# Patient Record
Sex: FEMALE | Race: WHITE | NOT HISPANIC OR LATINO | Employment: FULL TIME | URBAN - METROPOLITAN AREA
[De-identification: names, ages, dates, MRNs, and addresses within clinical notes are randomized per-mention and may not be internally consistent; named-entity substitution may affect disease eponyms.]

---

## 2023-05-10 ENCOUNTER — HOSPITAL ENCOUNTER (OUTPATIENT)
Dept: DATA CONVERSION | Facility: HOSPITAL | Age: 58
End: 2023-05-10
Attending: OTOLARYNGOLOGY | Admitting: OTOLARYNGOLOGY
Payer: COMMERCIAL

## 2023-05-10 DIAGNOSIS — J38.6 STENOSIS OF LARYNX: ICD-10-CM

## 2023-05-10 DIAGNOSIS — I10 ESSENTIAL (PRIMARY) HYPERTENSION: ICD-10-CM

## 2023-05-10 DIAGNOSIS — Z88.0 ALLERGY STATUS TO PENICILLIN: ICD-10-CM

## 2023-09-07 VITALS
BODY MASS INDEX: 0.28 KG/M2 | HEIGHT: 66 IN | HEART RATE: 73 BPM | RESPIRATION RATE: 16 BRPM | WEIGHT: 1.76 LBS | TEMPERATURE: 98.2 F | DIASTOLIC BLOOD PRESSURE: 73 MMHG | SYSTOLIC BLOOD PRESSURE: 134 MMHG

## 2023-09-14 NOTE — H&P
History of Present Illness:   Pregnant/Lactating:  ·  Are You Pregnant no (1)   ·  Are You Currently Breastfeeding no (1)     History Present Illness:  Reason for surgery: mdl   HPI:    HPI: No significant changes to the medical history since last clinic visit with ENT.    PMH: reviewed in chart   Fam Hx: Reviewed in EMR  Social Hx: Reviewed in EMR  Allergies: Reviewed in EMR  ROS: negative except as above in HPI  Medications, imaging and pertinent labs reviewed in EMR    A/P  Proceed with planned surgery     Allergies:        Allergies:  ·  penicillin : Facial Swelling    Home Medication Review:   Home Medications Reviewed: yes     Impression/Procedure:   ·  Impression and Planned Procedure: MDL       ERAS (Enhanced Recovery After Surgery):  ·  ERAS Patient: no       Vital Signs:  Temperature C: 36.8 degrees C   Temperature F: 98.2 degrees F   Heart Rate: 73 beats per minute   Respiratory Rate: 16 breath per minute   Blood Pressure Systolic: 134 mm/Hg   Blood Pressure Diastolic: 73 mm/Hg     Physical Exam by System:    Respiratory/Thorax: Unlabored breathing   Cardiovascular: No clubbing/cyanosis/edema of hands     Consent:   COVID-19 Consent:  ·  COVID-19 Risk Consent Surgeon has reviewed key risks related to the risk of carlos COVID-19 and if they contract COVID-19 what the risks are.     Attestation:   Note Completion:  I am a:  Resident/Fellow   Attending Attestation I saw and evaluated the patient.  I personally obtained the key and critical portions of the history and physical exam or was physically present for key and  critical portions performed by the resident/fellow. I reviewed the resident/fellow?s documentation and discussed the patient with the resident/fellow.  I agree with the resident/fellow?s medical decision making as documented in the note.     I personally evaluated the patient on 10-May-2023   Comments/ Additional Findings    Patient and I discussed the risks, benefits and alternatives  "to surgery and all questions answered.  Cleared to OR.          Electronic Signatures:  Steve Peck)  (Signed 10-May-2023 16:33)   Authored: Physical Exam, Note Completion   Co-Signer: History of Present Illness, Allergies, Home Medication Review, Impression/Procedure, ERAS, Physical Exam, Consent, Note Completion  Keven Sorto (Resident))  (Signed 10-May-2023 13:21)   Authored: History of Present Illness, Allergies, Home  Medication Review, Impression/Procedure, ERAS, Physical Exam, Consent, Note Completion      Last Updated: 10-May-2023 16:33 by Steve Peck)    References:  1.  Data Referenced From \"Patient Profile - Preop v3\" 10-May-2023 12:31   "

## 2023-09-25 PROBLEM — H61.22 IMPACTED CERUMEN OF LEFT EAR: Status: ACTIVE | Noted: 2023-09-25

## 2023-09-25 PROBLEM — J38.6 SUBGLOTTIC STENOSIS: Status: ACTIVE | Noted: 2023-09-25

## 2023-09-25 PROBLEM — R49.9 CHANGE IN VOICE: Status: ACTIVE | Noted: 2023-09-25

## 2023-09-25 PROBLEM — J98.8 AIRWAY OBSTRUCTION: Status: ACTIVE | Noted: 2023-09-25

## 2023-09-25 PROBLEM — R49.0 HOARSENESS OF VOICE: Status: ACTIVE | Noted: 2023-09-25

## 2023-09-25 PROBLEM — J38.7 LARYNGEAL MASS: Status: ACTIVE | Noted: 2023-09-25

## 2023-09-25 RX ORDER — FAMOTIDINE 20 MG/1
TABLET, FILM COATED ORAL
COMMUNITY
Start: 2023-04-19

## 2023-09-25 RX ORDER — FEXOFENADINE HCL 30 MG/5 ML
SUSPENSION, ORAL (FINAL DOSE FORM) ORAL
COMMUNITY
Start: 2021-11-11

## 2023-09-25 RX ORDER — METHYLPREDNISOLONE 4 MG/1
TABLET ORAL
COMMUNITY
Start: 2023-04-14

## 2023-09-25 RX ORDER — ALUMINUM HYDROXIDE, MAGNESIUM HYDROXIDE, AND SIMETHICONE 1200; 120; 1200 MG/30ML; MG/30ML; MG/30ML
SUSPENSION ORAL
COMMUNITY

## 2023-09-25 RX ORDER — TIZANIDINE 4 MG/1
TABLET ORAL
COMMUNITY
Start: 2023-03-01

## 2023-09-25 RX ORDER — LISINOPRIL AND HYDROCHLOROTHIAZIDE 20; 25 MG/1; MG/1
TABLET ORAL
COMMUNITY
Start: 2021-04-29

## 2023-09-25 RX ORDER — ASPIRIN 81 MG/1
TABLET ORAL
COMMUNITY

## 2023-09-25 RX ORDER — OMEPRAZOLE 20 MG/1
CAPSULE, DELAYED RELEASE ORAL
COMMUNITY
Start: 2021-08-19

## 2023-09-25 RX ORDER — ZOLPIDEM TARTRATE 10 MG/1
TABLET ORAL
COMMUNITY
Start: 2021-09-29

## 2023-09-25 RX ORDER — ASPIRIN 325 MG
TABLET, DELAYED RELEASE (ENTERIC COATED) ORAL
COMMUNITY

## 2023-09-25 RX ORDER — ASCORBIC ACID 250 MG
TABLET ORAL
COMMUNITY

## 2023-10-02 NOTE — OP NOTE
PROCEDURE DETAILS    Preoperative Diagnosis:  subglottic stenosis   Postoperative Diagnosis:  subglottic stenosis   Surgeon: md kimo  Resident/Fellow/Other Assistant: md ofelia    Procedure:  MicroDirect laryngoscopy, diagnostic  MicroDirect laryngoscopy with CO2 laser excision of stenosis  Bronchoscopy diagnostic  MicroDirect laryngoscopy with steroid injection  Estimated Blood Loss: 5  Findings: see        Operative Report:   Indication for Surgery :    58 yo F with subglottic stenosis with prior operative intervention. She is here today for repeat intervention, which may include dilation, CO2 laser, steroid injection with jet ventilation.    We discussed the risks and benefits to include, but not be limited to, hoarseness which may be permanent, swallow change which may require secondary intervention, bleeding; infection; damage to surrounding structures including the teeth, gums, lips, tongue;  laser fire/burn; taste change; medical complications; and risks of anesthesia.  Their questions are answered and they sign written consent.    Operative findings:  1. Scar band from the left vocal fold to the immediate subglottis, inferior to this was 25%circumferential subglottic stenosis   2. Scar was lysed and more inferior stenosis improved to 10-15% stenosis after interventions   3. Intubated with no resistance with 6-0 reg ETT at end of case     Operative Procedure :     The patient was brought back to the operating room and transferred to the operating room table.  Time out procedure was performed involving the entire OR team.  The patient was pre-oxygenated with 100% oxygen via facemask.  Patient was then turned to  the ENT team 90 degrees, and moldable tooth guard was placed in the upper dentition. The dedo laryngoscope was introduced transorally along the right side of the tongue.  No concerning masses or lesions were identified in the oral cavity, oropharynx,  or hypopharynx.  The laryngoscope was  secured on the mustard stand and the microscope was utilized to inspect the airway and used for the remainder of the procedure. Supraglottic jet ventilation was initiated by the surgeon and utilized throughout the  case. A full laser safety time out was then performed.     A rigid telescope was then advanced and the airway is visualized with findings as above.     Appropriate eye and face protections was applied to the patient and the operating room staff and a laser safety check was performed. The CO2 laser was used to perform a lysis of the left vocal fold scarring and to reduce the scarring immediately below  this scaring. We also performed a small wedge resection of the anterior subglottic stenosis.. Then, using an 18-20 mm balloon, the stenotic region was dilated x2 to 4-5 benjamin correlating to 19mm. There was a moderate release and improvement of stenosis  to ~10-15%.     Then, under endoscopic guidance, the areas that were laser treated were then injected with 1cc of Decadron of 10mg/ml.    This concluded the procedure and she was intubated with a regular 6-0 ETT through the dedo laryngoscope with no resistance.     The patient tolerated this well.  The patient was then turned back to Anesthesia for extubation and returned to the recovery room in satisfactory condition.  Sponge, needle, instrument counts were reported as correct.  Estimated blood loss was minimal.     Dr. Peck was present and participated in all aspects of procedure.                           Attestation:   Note Completion:  Attending Attestation I was present for the entire procedure    I am a: Resident/Fellow         Electronic Signatures:  Steve Peck)  (Signed 11-May-2023 22:03)   Authored: Note Completion   Co-Signer: Post-Operative Note, Chart Review, Note Completion  Keven Sorto (Resident))  (Signed 10-May-2023 15:20)   Authored: Post-Operative Note, Chart Review, Note Completion      Last Updated: 11-May-2023 22:03 by  Steve Peck)

## 2023-10-03 ENCOUNTER — OFFICE VISIT (OUTPATIENT)
Dept: OTOLARYNGOLOGY | Facility: CLINIC | Age: 58
End: 2023-10-03
Payer: COMMERCIAL

## 2023-10-03 VITALS — HEIGHT: 66 IN | BODY MASS INDEX: 29.51 KG/M2 | WEIGHT: 183.6 LBS

## 2023-10-03 DIAGNOSIS — J38.6 STENOSIS OF LARYNX: ICD-10-CM

## 2023-10-03 DIAGNOSIS — R49.8 OTHER VOICE AND RESONANCE DISORDERS: Primary | ICD-10-CM

## 2023-10-03 DIAGNOSIS — J38.6 SGS (SUBGLOTTIC STENOSIS): ICD-10-CM

## 2023-10-03 DIAGNOSIS — J39.8 TRACHEAL STENOSIS: ICD-10-CM

## 2023-10-03 PROCEDURE — 99214 OFFICE O/P EST MOD 30 MIN: CPT | Performed by: OTOLARYNGOLOGY

## 2023-10-03 PROCEDURE — 31575 DIAGNOSTIC LARYNGOSCOPY: CPT | Performed by: OTOLARYNGOLOGY

## 2023-10-03 PROCEDURE — 31579 LARYNGOSCOPY TELESCOPIC: CPT | Performed by: OTOLARYNGOLOGY

## 2023-10-03 PROCEDURE — 1036F TOBACCO NON-USER: CPT | Performed by: OTOLARYNGOLOGY

## 2023-10-03 PROCEDURE — 99214 OFFICE O/P EST MOD 30 MIN: CPT | Mod: 25,27 | Performed by: OTOLARYNGOLOGY

## 2023-10-03 RX ORDER — LOSARTAN POTASSIUM 25 MG/1
25 TABLET ORAL DAILY
COMMUNITY

## 2023-10-03 RX ORDER — HYDROCHLOROTHIAZIDE 25 MG/1
25 TABLET ORAL DAILY
COMMUNITY

## 2023-10-03 ASSESSMENT — PATIENT HEALTH QUESTIONNAIRE - PHQ9
1. LITTLE INTEREST OR PLEASURE IN DOING THINGS: NOT AT ALL
SUM OF ALL RESPONSES TO PHQ9 QUESTIONS 1 AND 2: 0
2. FEELING DOWN, DEPRESSED OR HOPELESS: NOT AT ALL

## 2023-10-03 NOTE — PROGRESS NOTES
ASSESSMENT AND PLAN:   Rosi Vincent is a 58 y.o. 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  She also has a scar band extending from the right vocal cord down to the subglottis.  She is noticing a worsening of her breathing patterns.  We discussed consideration of a cricoid tracheal resection versus a MicroDirect laryngoscopy with balloon dilation and CO2 laser.  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    Assessment/Plan   Surgery will be scheduled at a time that is convenient for her.       Reason For Consult  No chief complaint on file.         HISTORY OF PRESENT ILLNESS:  Rosi Vincent is a 58 y.o. female presenting for a follow up visit with me for cricotracheal and SG stenosis..  The patient reports shortness of breath is worsening.  However this is nowhere near as bad as it was previously + mild voice changes.      Recall 6/20/23: This is a 57 year old female with a history of idiopathic SGS with improvement in her breathing since our last procedure.     Bronchoscopy utilizing 4% lidocaine shows scar from the inferior left vocal cord down to the SGS. There is also ~15-20% narrowing at the level of the cricoid ring that is circumferentia.      She may benefit from cricotracheal resection in the future. We discussed indications for this and brief surgical plan. She will follow up with me in 6 months, or sooner if there are changes in her symptoms      Past Medical History  She has no past medical history on file. Surgical History  She has a past surgical history that includes Other surgical history (10/05/2021); Other surgical history (10/05/2021); and Other surgical history (10/05/2021).   Social History  She has no history on file for tobacco use, alcohol use, and drug use. Allergies  Penicillins     Family History  Family History   Problem Relation Name Age of Onset     Cerebral aneurysm Mother      Diabetes Father         Review of Systems  All 10 systems were reviewed and negative except for above.      Last Recorded Vitals  There were no vitals taken for this visit.    Physical Exam  ENT Physical Exam  Constitutional  Appearance: patient appears well-developed and well-nourished,  Head and Face  Appearance: head appears normal and face appears normal;  Ear  Auricles: right auricle normal; left auricle normal;  Nose  External Nose: nares patent bilaterally;  Oral Cavity/Oropharynx  Lips: normal;  Neck  Neck: neck normal; neck palpation normal;  Respiratory  Inspection: no retractions visible;  Cardiovascular  Inspection: no peripheral edema present;  Neurovestibular  Mental Status: alert and oriented;  Psychiatric: mood normal;  Cranial Nerves: cranial nerves intact;      Relevant Results       Patient Reported Outcome Measures         Radiology, Laboratory and Pathology  No results found.      Stroboscopy    Date/Time: 10/3/2023 12:06 PM    Performed by: Steve Peck MD  Authorized by: Steve Peck MD    Consent:     Consent obtained:  Verbal    Consent given by:  Patient  Anesthesia (see MAR for exact dosages):     Anesthesia method:  Topical application    Topical anesthesia: 4% Lidocaine.  Procedure details:     Indications: assessment of airway and videostroboscopy      Medication:  Afrin    Scope location: bilateral nare    Post-procedure details:     Patient tolerance of procedure:  Tolerated well, no immediate complications     I spent 30 minutes in the professional and overall care of this patient.

## 2023-10-17 ENCOUNTER — APPOINTMENT (OUTPATIENT)
Dept: OTOLARYNGOLOGY | Facility: CLINIC | Age: 58
End: 2023-10-17
Payer: COMMERCIAL

## 2023-12-19 DIAGNOSIS — J38.6 SUBGLOTTIC STENOSIS: ICD-10-CM

## 2024-01-09 ENCOUNTER — ANESTHESIA EVENT (OUTPATIENT)
Dept: OPERATING ROOM | Facility: HOSPITAL | Age: 59
End: 2024-01-09
Payer: COMMERCIAL

## 2024-01-10 ENCOUNTER — HOSPITAL ENCOUNTER (OUTPATIENT)
Facility: HOSPITAL | Age: 59
Setting detail: OUTPATIENT SURGERY
Discharge: HOME | End: 2024-01-10
Attending: OTOLARYNGOLOGY | Admitting: OTOLARYNGOLOGY
Payer: COMMERCIAL

## 2024-01-10 ENCOUNTER — ANESTHESIA (OUTPATIENT)
Dept: OPERATING ROOM | Facility: HOSPITAL | Age: 59
End: 2024-01-10
Payer: COMMERCIAL

## 2024-01-10 VITALS
WEIGHT: 178 LBS | BODY MASS INDEX: 28.61 KG/M2 | OXYGEN SATURATION: 96 % | RESPIRATION RATE: 12 BRPM | HEIGHT: 66 IN | TEMPERATURE: 98.4 F | SYSTOLIC BLOOD PRESSURE: 139 MMHG | DIASTOLIC BLOOD PRESSURE: 70 MMHG | HEART RATE: 70 BPM

## 2024-01-10 PROBLEM — I10 HTN (HYPERTENSION): Status: ACTIVE | Noted: 2024-01-10

## 2024-01-10 PROCEDURE — 7100000010 HC PHASE TWO TIME - EACH INCREMENTAL 1 MINUTE: Performed by: OTOLARYNGOLOGY

## 2024-01-10 PROCEDURE — 31630 BRONCHOSCOPY DILATE/FX REPR: CPT | Performed by: OTOLARYNGOLOGY

## 2024-01-10 PROCEDURE — 7100000002 HC RECOVERY ROOM TIME - EACH INCREMENTAL 1 MINUTE: Performed by: OTOLARYNGOLOGY

## 2024-01-10 PROCEDURE — 2500000005 HC RX 250 GENERAL PHARMACY W/O HCPCS: Performed by: ANESTHESIOLOGIST ASSISTANT

## 2024-01-10 PROCEDURE — A4217 STERILE WATER/SALINE, 500 ML: HCPCS | Performed by: OTOLARYNGOLOGY

## 2024-01-10 PROCEDURE — 2500000001 HC RX 250 WO HCPCS SELF ADMINISTERED DRUGS (ALT 637 FOR MEDICARE OP): Performed by: OTOLARYNGOLOGY

## 2024-01-10 PROCEDURE — 3600000007 HC OR TIME - EACH INCREMENTAL 1 MINUTE - PROCEDURE LEVEL TWO: Performed by: OTOLARYNGOLOGY

## 2024-01-10 PROCEDURE — 3700000002 HC GENERAL ANESTHESIA TIME - EACH INCREMENTAL 1 MINUTE: Performed by: OTOLARYNGOLOGY

## 2024-01-10 PROCEDURE — 31500 INSERT EMERGENCY AIRWAY: CPT | Performed by: OTOLARYNGOLOGY

## 2024-01-10 PROCEDURE — 7100000009 HC PHASE TWO TIME - INITIAL BASE CHARGE: Performed by: OTOLARYNGOLOGY

## 2024-01-10 PROCEDURE — 2500000004 HC RX 250 GENERAL PHARMACY W/ HCPCS (ALT 636 FOR OP/ED): Performed by: OTOLARYNGOLOGY

## 2024-01-10 PROCEDURE — 7100000001 HC RECOVERY ROOM TIME - INITIAL BASE CHARGE: Performed by: OTOLARYNGOLOGY

## 2024-01-10 PROCEDURE — 2500000004 HC RX 250 GENERAL PHARMACY W/ HCPCS (ALT 636 FOR OP/ED): Performed by: ANESTHESIOLOGY

## 2024-01-10 PROCEDURE — 3700000001 HC GENERAL ANESTHESIA TIME - INITIAL BASE CHARGE: Performed by: OTOLARYNGOLOGY

## 2024-01-10 PROCEDURE — 31541 LARYNSCOP W/TUMR EXC + SCOPE: CPT | Performed by: OTOLARYNGOLOGY

## 2024-01-10 PROCEDURE — A31571 PR LARYNGOSCOPY,DIRECT,SCOPE,INJ CORDS: Performed by: ANESTHESIOLOGIST ASSISTANT

## 2024-01-10 PROCEDURE — A31571 PR LARYNGOSCOPY,DIRECT,SCOPE,INJ CORDS: Performed by: ANESTHESIOLOGY

## 2024-01-10 PROCEDURE — 2720000007 HC OR 272 NO HCPCS: Performed by: OTOLARYNGOLOGY

## 2024-01-10 PROCEDURE — 31571 LARYNGOSCOP W/VC INJ + SCOPE: CPT | Performed by: OTOLARYNGOLOGY

## 2024-01-10 PROCEDURE — 2500000004 HC RX 250 GENERAL PHARMACY W/ HCPCS (ALT 636 FOR OP/ED): Performed by: ANESTHESIOLOGIST ASSISTANT

## 2024-01-10 PROCEDURE — 3600000002 HC OR TIME - INITIAL BASE CHARGE - PROCEDURE LEVEL TWO: Performed by: OTOLARYNGOLOGY

## 2024-01-10 PROCEDURE — A4649 SURGICAL SUPPLIES: HCPCS | Performed by: OTOLARYNGOLOGY

## 2024-01-10 RX ORDER — EPINEPHRINE 1 MG/ML
INJECTION, SOLUTION, CONCENTRATE INTRAVENOUS AS NEEDED
Status: DISCONTINUED | OUTPATIENT
Start: 2024-01-10 | End: 2024-01-10 | Stop reason: HOSPADM

## 2024-01-10 RX ORDER — PROPOFOL 10 MG/ML
INJECTION, EMULSION INTRAVENOUS CONTINUOUS PRN
Status: DISCONTINUED | OUTPATIENT
Start: 2024-01-10 | End: 2024-01-10

## 2024-01-10 RX ORDER — SODIUM CHLORIDE, SODIUM LACTATE, POTASSIUM CHLORIDE, CALCIUM CHLORIDE 600; 310; 30; 20 MG/100ML; MG/100ML; MG/100ML; MG/100ML
50 INJECTION, SOLUTION INTRAVENOUS CONTINUOUS
Status: DISCONTINUED | OUTPATIENT
Start: 2024-01-10 | End: 2024-01-10 | Stop reason: HOSPADM

## 2024-01-10 RX ORDER — DEXAMETHASONE SODIUM PHOSPHATE 100 MG/10ML
INJECTION INTRAMUSCULAR; INTRAVENOUS AS NEEDED
Status: DISCONTINUED | OUTPATIENT
Start: 2024-01-10 | End: 2024-01-10 | Stop reason: HOSPADM

## 2024-01-10 RX ORDER — MIDAZOLAM HYDROCHLORIDE 1 MG/ML
INJECTION INTRAMUSCULAR; INTRAVENOUS AS NEEDED
Status: DISCONTINUED | OUTPATIENT
Start: 2024-01-10 | End: 2024-01-10

## 2024-01-10 RX ORDER — DEXAMETHASONE SODIUM PHOSPHATE 100 MG/10ML
INJECTION INTRAMUSCULAR; INTRAVENOUS AS NEEDED
Status: DISCONTINUED | OUTPATIENT
Start: 2024-01-10 | End: 2024-01-10

## 2024-01-10 RX ORDER — SODIUM CHLORIDE, SODIUM LACTATE, POTASSIUM CHLORIDE, CALCIUM CHLORIDE 600; 310; 30; 20 MG/100ML; MG/100ML; MG/100ML; MG/100ML
INJECTION, SOLUTION INTRAVENOUS CONTINUOUS PRN
Status: DISCONTINUED | OUTPATIENT
Start: 2024-01-10 | End: 2024-01-10

## 2024-01-10 RX ORDER — PROPOFOL 10 MG/ML
INJECTION, EMULSION INTRAVENOUS AS NEEDED
Status: DISCONTINUED | OUTPATIENT
Start: 2024-01-10 | End: 2024-01-10

## 2024-01-10 RX ORDER — HYDROMORPHONE HYDROCHLORIDE 1 MG/ML
0.5 INJECTION, SOLUTION INTRAMUSCULAR; INTRAVENOUS; SUBCUTANEOUS EVERY 5 MIN PRN
Status: DISCONTINUED | OUTPATIENT
Start: 2024-01-10 | End: 2024-01-10 | Stop reason: HOSPADM

## 2024-01-10 RX ORDER — HYDROMORPHONE HYDROCHLORIDE 1 MG/ML
0.2 INJECTION, SOLUTION INTRAMUSCULAR; INTRAVENOUS; SUBCUTANEOUS EVERY 5 MIN PRN
Status: DISCONTINUED | OUTPATIENT
Start: 2024-01-10 | End: 2024-01-10 | Stop reason: HOSPADM

## 2024-01-10 RX ORDER — OXYCODONE HYDROCHLORIDE 5 MG/1
5 TABLET ORAL EVERY 4 HOURS PRN
Status: DISCONTINUED | OUTPATIENT
Start: 2024-01-10 | End: 2024-01-10 | Stop reason: HOSPADM

## 2024-01-10 RX ORDER — REMIFENTANIL HYDROCHLORIDE 1 MG/ML
INJECTION, POWDER, LYOPHILIZED, FOR SOLUTION INTRAVENOUS AS NEEDED
Status: DISCONTINUED | OUTPATIENT
Start: 2024-01-10 | End: 2024-01-10

## 2024-01-10 RX ORDER — ESMOLOL HYDROCHLORIDE 10 MG/ML
INJECTION INTRAVENOUS AS NEEDED
Status: DISCONTINUED | OUTPATIENT
Start: 2024-01-10 | End: 2024-01-10

## 2024-01-10 RX ORDER — SODIUM CHLORIDE 0.9 G/100ML
IRRIGANT IRRIGATION AS NEEDED
Status: DISCONTINUED | OUTPATIENT
Start: 2024-01-10 | End: 2024-01-10 | Stop reason: HOSPADM

## 2024-01-10 RX ORDER — LIDOCAINE HYDROCHLORIDE 10 MG/ML
0.1 INJECTION INFILTRATION; PERINEURAL ONCE
Status: DISCONTINUED | OUTPATIENT
Start: 2024-01-10 | End: 2024-01-10 | Stop reason: HOSPADM

## 2024-01-10 RX ORDER — ROCURONIUM BROMIDE 10 MG/ML
INJECTION, SOLUTION INTRAVENOUS AS NEEDED
Status: DISCONTINUED | OUTPATIENT
Start: 2024-01-10 | End: 2024-01-10

## 2024-01-10 RX ORDER — ACETAMINOPHEN 325 MG/1
650 TABLET ORAL EVERY 4 HOURS PRN
Status: DISCONTINUED | OUTPATIENT
Start: 2024-01-10 | End: 2024-01-10 | Stop reason: HOSPADM

## 2024-01-10 RX ORDER — ONDANSETRON HYDROCHLORIDE 2 MG/ML
INJECTION, SOLUTION INTRAVENOUS AS NEEDED
Status: DISCONTINUED | OUTPATIENT
Start: 2024-01-10 | End: 2024-01-10

## 2024-01-10 RX ORDER — ONDANSETRON HYDROCHLORIDE 2 MG/ML
4 INJECTION, SOLUTION INTRAVENOUS ONCE AS NEEDED
Status: DISCONTINUED | OUTPATIENT
Start: 2024-01-10 | End: 2024-01-10 | Stop reason: HOSPADM

## 2024-01-10 RX ORDER — GLYCOPYRROLATE 0.2 MG/ML
INJECTION INTRAMUSCULAR; INTRAVENOUS AS NEEDED
Status: DISCONTINUED | OUTPATIENT
Start: 2024-01-10 | End: 2024-01-10

## 2024-01-10 RX ADMIN — SODIUM CHLORIDE, POTASSIUM CHLORIDE, SODIUM LACTATE AND CALCIUM CHLORIDE 50 ML/HR: 600; 310; 30; 20 INJECTION, SOLUTION INTRAVENOUS at 15:50

## 2024-01-10 RX ADMIN — ROCURONIUM BROMIDE 50 MG: 10 INJECTION INTRAVENOUS at 14:36

## 2024-01-10 RX ADMIN — MIDAZOLAM HYDROCHLORIDE 2 MG: 1 INJECTION, SOLUTION INTRAMUSCULAR; INTRAVENOUS at 14:09

## 2024-01-10 RX ADMIN — HYDROMORPHONE HYDROCHLORIDE 0.2 MG: 1 INJECTION, SOLUTION INTRAMUSCULAR; INTRAVENOUS; SUBCUTANEOUS at 15:47

## 2024-01-10 RX ADMIN — REMIFENTANIL HYDROCHLORIDE 30 MCG: 1 INJECTION, POWDER, LYOPHILIZED, FOR SOLUTION INTRAVENOUS at 14:39

## 2024-01-10 RX ADMIN — PROPOFOL 200 MG: 10 INJECTION, EMULSION INTRAVENOUS at 14:34

## 2024-01-10 RX ADMIN — SODIUM CHLORIDE, POTASSIUM CHLORIDE, SODIUM LACTATE AND CALCIUM CHLORIDE: 600; 310; 30; 20 INJECTION, SOLUTION INTRAVENOUS at 13:32

## 2024-01-10 RX ADMIN — ESMOLOL HYDROCHLORIDE 100 MG: 10 INJECTION, SOLUTION INTRAVENOUS at 14:39

## 2024-01-10 RX ADMIN — ONDANSETRON 4 MG: 2 INJECTION, SOLUTION INTRAMUSCULAR; INTRAVENOUS at 14:55

## 2024-01-10 RX ADMIN — REMIFENTANIL HYDROCHLORIDE 0.1 MCG/KG/MIN: 1 INJECTION, POWDER, LYOPHILIZED, FOR SOLUTION INTRAVENOUS at 14:36

## 2024-01-10 RX ADMIN — DEXAMETHASONE SODIUM PHOSPHATE 10 MG: 10 INJECTION INTRAMUSCULAR; INTRAVENOUS at 14:43

## 2024-01-10 RX ADMIN — PROPOFOL 75 MCG/KG/MIN: 10 INJECTION, EMULSION INTRAVENOUS at 14:36

## 2024-01-10 RX ADMIN — SUGAMMADEX 200 MG: 100 INJECTION, SOLUTION INTRAVENOUS at 15:01

## 2024-01-10 RX ADMIN — GLYCOPYRROLATE 0.2 MG: 0.2 INJECTION INTRAMUSCULAR; INTRAVENOUS at 14:07

## 2024-01-10 ASSESSMENT — PAIN - FUNCTIONAL ASSESSMENT
PAIN_FUNCTIONAL_ASSESSMENT: 0-10

## 2024-01-10 ASSESSMENT — COLUMBIA-SUICIDE SEVERITY RATING SCALE - C-SSRS
6. HAVE YOU EVER DONE ANYTHING, STARTED TO DO ANYTHING, OR PREPARED TO DO ANYTHING TO END YOUR LIFE?: NO
1. IN THE PAST MONTH, HAVE YOU WISHED YOU WERE DEAD OR WISHED YOU COULD GO TO SLEEP AND NOT WAKE UP?: NO
2. HAVE YOU ACTUALLY HAD ANY THOUGHTS OF KILLING YOURSELF?: NO

## 2024-01-10 ASSESSMENT — PAIN SCALES - GENERAL
PAINLEVEL_OUTOF10: 0 - NO PAIN
PAINLEVEL_OUTOF10: 5 - MODERATE PAIN
PAINLEVEL_OUTOF10: 2
PAINLEVEL_OUTOF10: 0 - NO PAIN
PAINLEVEL_OUTOF10: 6

## 2024-01-10 ASSESSMENT — PAIN DESCRIPTION - LOCATION: LOCATION: THROAT

## 2024-01-10 NOTE — ANESTHESIA PROCEDURE NOTES
Airway  Date/Time: 1/10/2024 2:37 PM  Urgency: elective      Staffing  Performed: resident   Authorized by: Agustina Lemus MD    Performed by: CEDRICK Judd  Patient location during procedure: OR    Indications and Patient Condition  Indications for airway management: anesthesia  Spontaneous ventilation: present  Sedation level: deep  Preoxygenated: yes  Patient position: sniffing      Final Airway Details  Final airway type: endotracheal airway      Successful airway: jet and laser tube     Placement verified by: capnometry   Number of attempts at approach: 1  Ventilation between attempts: BVM    Additional Comments  Airway planned to be done by surgeon with jet ventilator and MLT

## 2024-01-10 NOTE — ANESTHESIA PREPROCEDURE EVALUATION
Patient: Rosi Vincent    Procedure Information       Date/Time: 01/10/24 1300    Procedure: MICRODIRECT LARYNGOSCOPY, BRONCHOSCOPY, CO2 LASER, BALLOON DILATION, STEROID INJECTION, JET VENTILATOR (Bilateral) - JET VENTILATOR    Location: [facility] OR 05 / [facility]    Surgeons: Steve Peck MD            Relevant Problems   Anesthesia (within normal limits)      Cardiovascular   (+) HTN (hypertension)      Eyes, Ears, Nose, and Throat  Subglottic stenosis.       Clinical information reviewed:   Tobacco  Allergies  Meds   Med Hx  Surg Hx   Fam Hx  Soc Hx        NPO Detail:  NPO/Void Status  Date of Last Liquid: 01/09/24  Time of Last Liquid: 2300  Date of Last Solid: 01/09/24  Time of Last Solid: 2300  Last Intake Type: Clear fluids; Light meal  Time of Last Void: 1249         Physical Exam    Airway  Mallampati: III     Cardiovascular    Dental    Pulmonary    Abdominal            Anesthesia Plan    History of general anesthesia?: yes  History of complications of general anesthesia?: no    ASA 2     general     intravenous induction   Anesthetic plan and risks discussed with patient and spouse.    Plan discussed with CAA.

## 2024-01-10 NOTE — H&P
History of Present IllnessThis is a 57 year old female with a history of idiopathic SGS s/p previous airway surgery with Dr. Peck.  The patient reports shortness of breath is worsening      Review of Systems  A 10 point ROS survey and extensive H&N symptom survey was conducted and discussed with the patient. Pertinent positive items are discussed in HPI.      Active Problems     · Airway obstruction (519.8) (J98.8)   · Change in voice (784.49) (R49.9)   · Hoarseness of voice (784.42) (R49.0)   · Impacted cerumen of left ear (380.4) (H61.22)   · Laryngeal mass (478.79) (J38.7)   · Subglottic stenosis (478.74) (J38.6)     Surgical History     · History of Ankle surgery   · History of Cervical vertebral fusion   · History of  section     Family History     · Family history of cerebral aneurysm (V17.1) (Z82.49)     · Family history of diabetes mellitus (V18.0) (Z83.3)     Social History     · Caffeine use (V49.89) (Z78.9)   · Non-smoker (V49.89) (Z78.9)     Allergies     · Penicillins   Recorded By: Sangeeta Orozco; 10/5/2021 1:48:53 PM     Current Meds     Medication Name Instruction   Aspirin 81 MG TABS     guaiFENesin 200 MG Oral Tablet Take 1 tablet in the morning with a glass of water as needed for excess mucus   Humidifier USE AS DIRECTED.   Lisinopril-hydroCHLOROthiazide 20-25 MG Oral Tablet     Omeprazole 20 MG Oral Capsule Delayed Release     Vitamin C TABS     Vitamin D3 CAPS     Zolpidem Tartrate 10 MG Oral Tablet        Vitals  Vital Signs     Recorded: 2022 11:12AM   Height 5 ft 6 in   Weight 201 lb 4 oz   BMI Calculated 32.48 kg/m2   BSA Calculated 2.01   Tobacco Use b) No   PHQ-2  #1. Over the last 2 weeks have you felt down, depressed or hopeless?  (If yes, answer PHQ-9 below) No   PHQ-2  #2. Over the last 2 weeks have you felt little interest  or pleasure in doing things?  (If yes, answer PHQ-9 below) No   Falls Screening (Age 18+) a) No falls within the last year   Pain Scale 0/10       Physical Exam  VITALS: reviewed, stable  GEN: well appearing, no acute distress, pleasant   SKIN: no rashes or suspicious lesions of H&N  HEAD: symmetric facial features, no facial tenderness or step-off deformities   SALIVARY GLANDS:parotid and submandibular glands normal bilaterally   NEURO: alert, conversant, oriented times 3, cranial nerves II-XII intact and symmetric bilaterally   EYES:EOM grossly Intact, sclera white, no ptosis, no enophthalmos/exophthalmos   EARS:normal hearing to whispered voice   RIGHT:normal auricle, no preauricular pits/tag, EAC patent without any granulation tissue,mucosal lesions, edema, or otorrhea, TM translucent, intact, no evidence of AOM or CATY    LEFT: normal auricle, no preauricular pits/tag, EAC patent and without granulation tissue, mucosal lesions, edema, or otorrhea, TM translucent, intact, no evidence of AOM or CATY   NOSE: no external nasal deformities, nasal mucosa dry, no intranasal masses appreciated via anterior rhinoscopy, anterior rhinoscopy is normal with limited visualization to the anterior aspect of the interior turbinates   ORAL CAVITY: normal mucous membranes, no lip or oral mucosal lesions, tongue mobile   OROPHARYNX: Normal uvula, symmetric soft palate elevation, tonsils symmetric, no pharyngeal wall asymmetries   PHARYNGEAL WALLS AND NASOPHARYNX:?no masses noted   NECK: trachea midline, no thyromegaly or cervical lymphadenopathy appreciated, well healed scar on right neck. Good neck ROM  SKIN:neck skin is without scar or injury   RESP/LARYNX: breathing comfortably, no stridor, normal work of breathing on room air   CARD:no clubbing/cyanosis/edema in hands, skin warm, well perfused   PSYCH: normal affect, euthymic     A/P:  Rosi Vincent is a 58 y.o. 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  She also has a scar band extending from the right vocal cord down to the subglottis.  She is noticing a  worsening of her breathing patterns.

## 2024-01-10 NOTE — OP NOTE
MICRODIRECT LARYNGOSCOPY, BRONCHOSCOPY, CO2 LASER, BALLOON DILATION, STEROID INJECTION, JET VENTILATOR (B) Operative Note     Date: 1/10/2024  OR Location: [facility] OR    Name: Rosi Vincent, : 1965, Age: 58 y.o., MRN: [id number], Sex: female    Diagnosis  Pre-op Diagnosis     * Subglottic stenosis [J38.6] Post-op Diagnosis     * Subglottic stenosis [J38.6]     Procedures  Microdirect laryngoscopy  Bronchoscopy  Steroid injection to subglottis  Balloon dilation subglottis  CO2 laser excision subglottic stenosis  Jet ventilation    Surgeons      * Steve Peck - Primary    Resident/Fellow/Other Assistant:  Surgeon(s) and Role:     * Zohreh Granados MD - Resident - Assisting    Procedure Summary  Anesthesia: General  ASA: II  Anesthesia Staff: Anesthesiologist: Agustina Lemus MD  C-AA: CEDRICK Judd  Estimated Blood Loss: 0mL  Intra-op Medications:   Medication Name Total Dose   dexAMETHasone (Decadron) injection 0.3 mg   sodium chloride 0.9 % irrigation solution 1,000 mL   EPINEPHrine HCl (PF) (Adrenalin) injection 4 mg   fluorescein 1 mg ophthalmic strip 1 strip               Anesthesia Record               Intraprocedure I/O Totals          Intake    Propofol Drip 0.00 mL    The total shown is the total volume documented since Anesthesia Start was filed.    lactated Ringer's 900.00 mL    Total Intake 900 mL       Output    Est. Blood Loss 5 mL    Total Output 5 mL       Net    Net Volume 895 mL          Specimen: No specimens collected     Staff:   Circulator: Tylor Cox RN  Scrub Person: Luzma Hubbard      Findings: posteriorly based 30% stenosis of subglottis.  Normal appearing trachea down to naida and main stem. Used laser to perform wedge resection/removal of excess tissue, Ablated tissue on posterior aspect.  Dilated up to 16 mm with balloon.  Injected with 0.3cc Decadron (10mg/cc).  Easily intubated with 5-0 ETT at end of case    Indications: Rosi Vincent is an 58 y.o.  female who is having surgery for Subglottic stenosis [J38.6].  57 year old female with a history of idiopathic SGS s/p previous airway surgery with Dr. Peck.  The patient reports shortness of breath is worsening. Patient potentially is a candidate for more definitive surgery but prefers to undergo endoscopic treatment for now.    The patient was seen in the preoperative area. The risks, benefits, complications, treatment options, non-operative alternatives, expected recovery and outcomes were discussed with the patient. The patient concurred with the proposed plan, giving informed consent.  The site of surgery was properly noted/marked if necessary per policy. The patient has been actively warmed in preoperative area. Preoperative antibiotics are not indicated. Venous thrombosis prophylaxis have been ordered including bilateral sequential compression devices    Procedure: The patient was brought back to the operating room and transferred to the operating room table.  The patient was pre-oxygenated with 100% oxygen.  The bed was turned 90° to the laryngology team.    A dental guard was placed on the upper dentition.  The dedo laryngoscope was introduced transorally along the right side of the tongue.  No concerning masses or lesions were identified in the oral cavity, oropharynx or pharynx. Supraglottic jet ventilation was started in a high frequency mode and stopped intermittently to evaluate the airway.     Jet Settings: Pressure 30 mmHg, IT% 50%, f=120 Hz; chest rise visible.    A telescope was used to inspect the airway.  Photodocumentation was performed.     The dedo laryngoscope was advanced into a slightly subglottic position and the patient was prepared for CO2 laser use in the airway. The subglottic inlet was posteriorly stenosed at the level of the cricoid ring. A telescope was advanced into distal airway.  No other concerns were seen down to the naida.  Next, the laser and operating microscope were  brought into position.     A laser safety time out was performed.  Oxygen concentration was dropped to below 35% with the jet ventilator, eye protection and wet towels were placed upon the face. No nitrous was in use. The line of sight laser mounted to the microscope with a micromanipulator was used to incise the portions of subglottic stenosis in a wedge resection.     A radial expansion balloon with guidewire was inserted from above and inflated to 3atm and subsequently 5 benjamin with visualization using the telescope and microscope.   This resulted in significant improvement in lumen diameter.  At the conclusion the laser portion, the airway was widely open.  Injection of steroid (decdron 10mg/cc x 1 cc) into the area of the stenosis was performed.      The patient was then turned back to the anesthesia team for emergence after surgeon intubation with a 5-0 ETT.  The patient was transferred to the PACU in stable condition.      Complications:  None; patient tolerated the procedure well.    Disposition: PACU - hemodynamically stable.  Condition: stable     Attending Attestation:     Steve Peck  Phone Number: [phone]

## 2024-01-10 NOTE — DISCHARGE INSTRUCTIONS
".  Postoperative Care Instructions:  Microdirect Laryngoscopy/Bronchoscopy with Laser Airway Surgery    Postoperative Care:  For your surgery, special microscopic instruments were used and an operating telescope was placed in your mouth to look at your vocal cords and trachea to treat your airway. No external incisions made.      It is normal for your voice to be hoarse for several days or weeks after surgery while the healing process occurs.     Your surgeon may also ask you to perform \"Voice rest\" which means no speaking for the period of time requested. Once you are allowed to start talking, it is very important to use a “conservative” or “confidential voice” after surgery to allow your voice to recover.   This means that you are using your normal voice at a much lower volume than usual, without any straining, yelling, screaming, or singing.  This typically sounds like a soft or breathy whisper.  It is also important to avoid extended periods of voice use.   Do not speak to anyone who is farther than an arm's length away, as this would require you to raise your voice.      It is very important to avoid excessive coughing or throat clearing after surgery, as this will slow down the healing process.  If you have an urge to cough that you cannot control on your own with relaxation techniques, you can purchase an over-the-counter cough suppressant to use, but make sure it does not interact with your other medications.      Finally, make sure to drink plenty of water to stay well hydrated after surgery, as this will help to speed your recovery by keeping your secretions thin and your vocal cords moist.  Use of cough lozenges is also allowed.      Diet: You may resume your normal diet after surgery. You may want to start out with liquids and light meals and advance to your usual diet as tolerated.     Our Nurse will contact you a few days after surgery to schedule your follow up appointment, which is typically 3-6 " weeks after surgery.  For any questions or concerns, please call the respective office between the hours of 9am-4pm.   Please call:  Dr. Peck's office @ [phone]  Dr. Marcano's office @ [phone]   Dr. Stein's office @ [phone]  If issues arise over the weekend or after hours, please call our hospital  at [phone] and ask for the ENT resident on call.    What to Expect Following Surgery:    The following are some symptoms that may follow your recent surgery:    Pain - Some mild pain is normal after surgery.  As adequate pain control is necessary for healing, please take over-the-counter Tylenol or Motrin per the package directions as needed for pain.  Occasionally, a narcotic pain medication is prescribed for your use after surgery.  If this is the case, please take the medication only as directed.  You may need to take an over-the-counter stool softener as narcotic pain medications can cause constipation. Massage, cold packs, relaxation techniques, listening to music, and positive thinking will also help control your postoperative pain.    Taste disturbance and/or tongue numbness - Due to the surgical instruments used during surgery, you may experience tongue numbness and/or taste disturbance after surgery, but this is usually temporary.  It may take 1-4 weeks to completely resolve.  It is also normal for your tongue to feel swollen or bruised after surgery, and this will also resolve in a few days.    Bleeding - For a few days after surgery, it is normal to cough up some blood in your mucus.  However, if you experience continuous bright red bleeding from your mouth or if you are coughing up blood clots, please call your doctor's office immediately.  If you are on any blood thinning medications, such as Aspirin, Plavix, or Coumadin, you may restart them immediately after surgery unless you were specifically told not to do so by your surgeon.    Muscle aches/soreness - You may experience  generalized muscle aches and/or soreness after surgery, and this is a normal effect of anesthesia.  They should completely resolve after a few days.     Swelling/throat tightness - If you were given steroid medication, this can help with swelling and should be taken as directed. The side effects from steroid use is typically minimal when taken for short periods, as used in these cases. If you have questions, please discuss with your pharmacist.

## 2024-01-10 NOTE — POST-PROCEDURE NOTE
Discharge instructions reviewed with patient and patients . Patient and  understand all instructions given. Patients vitals are stable for discharge.

## 2024-01-10 NOTE — ANESTHESIA POSTPROCEDURE EVALUATION
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    Anesthesia Post Evaluation    Patient location during evaluation: PACU  Patient participation: complete - patient participated  Level of consciousness: awake  Pain management: adequate  Airway patency: patent  Cardiovascular status: acceptable  Respiratory status: acceptable  Hydration status: acceptable  Postoperative Nausea and Vomiting: none        No notable events documented.

## 2024-02-06 ENCOUNTER — OFFICE VISIT (OUTPATIENT)
Dept: OTOLARYNGOLOGY | Facility: CLINIC | Age: 59
End: 2024-02-06
Payer: COMMERCIAL

## 2024-02-06 VITALS — TEMPERATURE: 96.8 F | WEIGHT: 184.3 LBS | HEIGHT: 65 IN | BODY MASS INDEX: 30.71 KG/M2

## 2024-02-06 DIAGNOSIS — R49.0 MUSCLE TENSION DYSPHONIA: ICD-10-CM

## 2024-02-06 DIAGNOSIS — R49.0 VOICE HOARSENESS: ICD-10-CM

## 2024-02-06 DIAGNOSIS — J39.8 TRACHEAL STENOSIS: Primary | ICD-10-CM

## 2024-02-06 DIAGNOSIS — R49.8 OTHER VOICE AND RESONANCE DISORDERS: ICD-10-CM

## 2024-02-06 PROCEDURE — 99214 OFFICE O/P EST MOD 30 MIN: CPT | Performed by: OTOLARYNGOLOGY

## 2024-02-06 PROCEDURE — 1036F TOBACCO NON-USER: CPT | Performed by: OTOLARYNGOLOGY

## 2024-02-06 PROCEDURE — 31575 DIAGNOSTIC LARYNGOSCOPY: CPT | Performed by: OTOLARYNGOLOGY

## 2024-02-06 ASSESSMENT — PATIENT HEALTH QUESTIONNAIRE - PHQ9
1. LITTLE INTEREST OR PLEASURE IN DOING THINGS: NOT AT ALL
SUM OF ALL RESPONSES TO PHQ9 QUESTIONS 1 & 2: 0
2. FEELING DOWN, DEPRESSED OR HOPELESS: NOT AT ALL

## 2024-02-06 NOTE — PROGRESS NOTES
ASSESSMENT AND PLAN:   Rosi Vincent is a 58 y.o. 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 She has improved airway with less concerns for her breathing. Her voice is back to her baseline.      Today's examination to include flexible laryngoscopy demonstrates mild stenosis of the subglottis trachea, ~ 20% narrowing. This is improved from previous, but no scar band along the anterior vocal cord. Pathology was benign with fibrosis and mild inflammation.     I would like to see the patient back as needed. We discussed peak flow meter and she is not interested in doing this at this time.         Reason For Consult  No chief complaint on file.       HISTORY OF PRESENT ILLNESS:  Rosi Vincent is a 58 y.o. female presenting for a follow up visit with me s/p MDL with CO2 laser excision of SGS and balloon dilation with steroid injection on 01/10/2024.      The patient reports that she has improvement in her voice.  She noted voice changes postoperatively that is back to her baseline. Today, she has complaints of coughing up phlegm. This has been present for one week.          Prior History:   Last seen on 10/03/2023 with findings of critical tracheal stenosis of the airway.  She also has a scar band extending from the right vocal cord down to the subglottis.  She is noticing a worsening of her breathing patterns.  We discussed consideration of a cricoid tracheal resection versus a MicroDirect laryngoscopy with balloon dilation and CO2 laser.  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      Past Medical History  She has no past medical history on file. Surgical History  She has a past surgical history that includes Other surgical history (10/05/2021); Other surgical history (10/05/2021); and Other surgical history (10/05/2021).   Social History  She reports that she has never  smoked. She has never used smokeless tobacco. She reports that she does not drink alcohol and does not use drugs. Allergies  Penicillins     Family History  Family History   Problem Relation Name Age of Onset    Cerebral aneurysm Mother      Diabetes Father         Review of Systems  All 10 systems were reviewed and negative except for above.      Last Recorded Vitals  There were no vitals taken for this visit.    Physical Exam  ENT Physical Exam  Constitutional  Appearance: patient appears well-developed and well-nourished,  Head and Face  Appearance: head appears normal and face appears normal;  Ear  Auricles: right auricle normal; left auricle normal;  Nose  External Nose: nares patent bilaterally;  Oral Cavity/Oropharynx  Lips: normal;  Neck  Neck: neck normal; neck palpation normal;  Respiratory  Inspection: no retractions visible;  Cardiovascular  Inspection: no peripheral edema present;  Neurovestibular  Mental Status: alert and oriented;  Psychiatric: mood normal;  Cranial Nerves: cranial nerves intact;          Procedures     Flexible Laryngoscopy w/ Videostroboscopy    VOICE AND SPEECH CHARACTERISTICS:  Normal spoken speech, no dysphonia, no roughness, no breathiness, no asthenia, no strain.    Intelligibility: normal.   Resonance: balanced.   Vocal Loudness: normal.   Breath Support: normal.    PROCEDURE:    Indications: voice change  Procedure Note    Post-operative Diagnosis: same    Anesthesia: Lidocaine 4% and Kal-Synephrine 1/2%    Endoscopy Type:  Flexible Laryngoscopy    Procedure Details:    The patient was placed in the sitting position.  After topical anesthesia and decongestion, the 4 mm laryngoscope was passed.  The nasal cavities, nasopharynx, oropharynx, hypopharynx, and larynx were all examined.  Vocal cords were examined during respiration and phonation.  The following findings were noted:    Condition:  Stable.  Patient tolerated procedure well.    Complications:  None  Patient is seated  in the exam chair. After adequate topical anesthesia, I advance the flexible endoscope. The examination included evaluation of the delvalle, vallecula, base of tongue, pyriforms, post-cricoid area, larynx and immediate subglottis.  Findings : assessment of the nasopharynx, base of tongue/vallecula, pyriform sinuses, post-cricoid area and pharyngeal walls was without lesion or mass, pharyngeal wall contraction is normal and symmetric, and no pooling of secretions  subglottic edema:2 (present)  Gross Arytenoid Movement: symmetric.  Arytenoid Height: normal.   Supraglottic Tension: none.  Additional Findings: 20% stenosis of the subglottis trachea. Mild erythema at the site of the resection of the anterior subglottis.       Time Spent  Prep time on day of patient encounter: 10 minutes  Time spent directly with patient, family or caregiver: 15 minutes  Additional Time Spent on Patient Care Activities/Discussion with SLP re care plan: 5 minutes  Documentation Time: 10 minutes  Other Time Spent: 0 minutes  Total: 40 minutes     Scribe Attestation  By signing my name below, I, Ilana Juares , Scribe attest that this documentation has been prepared under the direction and in the presence of Steve Peck MD.

## (undated) DEVICE — COUNTER, NEEDLE, FOAM BLOCK, W/MAGNET, W/BLADE GUARD, 10 COUNT, RED, LF

## (undated) DEVICE — PAD, EYE, OVAL, 1 5/8 X 2 5/8 IN, STERILE

## (undated) DEVICE — REST, HEAD, BAGEL, 9 IN

## (undated) DEVICE — COVER, CART, 45 X 27 X 48 IN, CLEAR

## (undated) DEVICE — CUP, SOLUTION

## (undated) DEVICE — DEVICE, INFLATION, ENDOTEK BIG 60

## (undated) DEVICE — Device

## (undated) DEVICE — SYRINGE, 60 CC, IRRIGATION, BULB, CONTRO-BULB, PAPER POUCH

## (undated) DEVICE — MARKER, SKIN, RULER AND LABEL PACK, CUSTOM

## (undated) DEVICE — COVER, MAYO STAND, W/PAD, 23 IN, DISPOSABLE, PLASTIC, LF, STERILE

## (undated) DEVICE — ACUSPOT 712 MICROMANIPULATOR

## (undated) DEVICE — MANIFOLD, 4 PORT NEPTUNE STANDARD

## (undated) DEVICE — BALLOON, ESOPHAGEAL, ELATION, 8CM, 18-19-20, FIXED WIRE

## (undated) DEVICE — DENTITION PROTECTOR, QUICKGUARD, NON-PERF

## (undated) DEVICE — PITCHER, GRADUATE, 32 OZ (1200CC), STERILE

## (undated) DEVICE — SHIELD, EYE, FOX, CONVEX, ALUMINUM, NS